# Patient Record
Sex: MALE | Race: WHITE | NOT HISPANIC OR LATINO
[De-identification: names, ages, dates, MRNs, and addresses within clinical notes are randomized per-mention and may not be internally consistent; named-entity substitution may affect disease eponyms.]

---

## 2021-05-26 ENCOUNTER — TRANSCRIPTION ENCOUNTER (OUTPATIENT)
Age: 31
End: 2021-05-26

## 2021-10-26 ENCOUNTER — EMERGENCY (EMERGENCY)
Facility: HOSPITAL | Age: 31
LOS: 1 days | Discharge: HOME | End: 2021-10-26
Attending: EMERGENCY MEDICINE
Payer: COMMERCIAL

## 2021-10-26 VITALS
HEIGHT: 73 IN | HEART RATE: 68 BPM | SYSTOLIC BLOOD PRESSURE: 124 MMHG | WEIGHT: 169.98 LBS | TEMPERATURE: 98 F | OXYGEN SATURATION: 98 % | RESPIRATION RATE: 18 BRPM | DIASTOLIC BLOOD PRESSURE: 85 MMHG

## 2021-10-26 PROCEDURE — 99282 EMERGENCY DEPT VISIT SF MDM: CPT

## 2021-10-26 NOTE — ED PROVIDER NOTE - PHYSICAL EXAMINATION
Gen: Well appearing in NAD  Head: NC/AT  Neck: trachea midline  Resp:  No distress  Ext: no deformities  Neuro:  A&O appears non focal  Skin:  Warm and dry as visualized  Psych:  Normal affect and mood    EYES EUFEMIA EOMI left sclera injected no fb

## 2021-10-26 NOTE — ED PROVIDER NOTE - PATIENT PORTAL LINK FT
You can access the FollowMyHealth Patient Portal offered by [facility] by registering at the following website: [email or web address]. By joining [employer]’s FollowMyHealth portal, you will also be able to view your health information using other applications (apps) compatible with our system.

## 2021-10-26 NOTE — ED PROVIDER NOTE - OBJECTIVE STATEMENT
32 yo male presenting to the ER with blood exposure to the eye while pigtail was being removed. no corrective lenses or contacts in use. immediately flushed eye. no visual changes UTD vaccines.

## 2021-10-26 NOTE — ED PROVIDER NOTE - CLINICAL SUMMARY MEDICAL DECISION MAKING FREE TEXT BOX
32 yo male splashed with blood to the face and left eye no visual changes irrigated at eye wash station 15 mins. no acute distress vitals stable. labs and referred to employee health

## 2021-10-26 NOTE — ED ADULT TRIAGE NOTE - WEIGHT IN KG
Patient : Frank De Leon Age: 28 year old Sex: male   MRN: [id number] Encounter Date: 2020      History     Chief Complaint   Patient presents with   • Head Injury With LOC     HPI    28-year-old male presenting to the emergency department today after syncopal episode, head injury.  Patient reports that he was at a friend's house, felt very dizzy, lightheaded, passed out, hit his head on the ground.  Unconscious for 2-3 seconds.  No seizure activity noted.    Patient does state that he has been trying “intermittent fasting” to lose weight.  Only ate a taco salad today and had a cup of coffee.  Did have a strenuous workout this evening.  Drink a pre workout drink prior to the workout.  Has been drinking water.  No dizziness currently.  No headache.  No nausea or vomiting.  No chest pain or shortness of breath.  No other recent illness or injury.    No Known Allergies    Discharge Medication List as of 2020 10:55 PM      Prior to Admission Medications    Details   Lidocaine 5 % Cream Apply 1 application topically every 4 hours as needed (pain). MIX WITH PREPARATION H & HYDROCORTISONEEprescribe, Disp-30 g, R-0      pramoxine-phenylephrine-glycerin-petrolatum (PREPARATION H) 1-0.25-14.4-15 % rectal cream MIX WITH LIDOCAINE & HYDROCORTISONE AND APPLY EVERY 4 HOURS AS NEEDEDDisp-51 g, R-0, Eprescribe             Past Medical History:   Diagnosis Date   • No known problems        Past Surgical History:   Procedure Laterality Date   • NO PAST SURGERIES         Family History   Problem Relation Age of Onset   • Diabetes Maternal Grandmother    • Heart disease Maternal Grandmother    • Diabetes Paternal Grandmother    • Diabetes Maternal Grandfather    • Heart disease Maternal Grandfather    • Diabetes Paternal Grandfather        Social History     Tobacco Use   • Smoking status: Current Every Day Smoker     Packs/day: 0.25     Last attempt to quit: 2013     Years since quittin.0   • Smokeless tobacco: Former  User     Types: Chew   Substance Use Topics   • Alcohol use: Not Currently   • Drug use: Not Currently       Review of Systems  Constitutional, Eyes, ENT, Pulmonary, Cardiovascular, Gastrointestinal, Renal, Endocrine, Genitourinary, Musculoskeletal, Neurologic, Skin, and Psychiatric systems were reviewed and negative unless indicated in the HPI above.    Physical Exam     ED Triage Vitals [06/24/20 2158]   ED Triage Vitals Group      Temp 97.9 °F (36.6 °C)      Heart Rate 91      Resp 18      /61      SpO2 97 %      EtCO2 mmHg       Height 6' 1\" (1.854 m)      Weight 286 lb 9.6 oz (130 kg)      Weight Scale Used ED Actual      BMI (Calculated) 37.81      IBW/kg (Calculated) 79.9       Physical Exam   Constitutional: He is oriented to person, place, and time. He appears well-developed and well-nourished. He is cooperative. No distress.   HENT:   Head: Normocephalic and atraumatic.   Eyes: Pupils are equal, round, and reactive to light. Conjunctivae and EOM are normal. Right eye exhibits no discharge. Left eye exhibits no discharge.   Neck: Normal range of motion. No tracheal deviation present.   Cardiovascular: Normal rate, regular rhythm and normal heart sounds. Exam reveals no gallop and no friction rub.   No murmur heard.  Pulses:       Radial pulses are 2+ on the right side.   Pulmonary/Chest: Effort normal and breath sounds normal. No stridor. No respiratory distress. He has no wheezes. He has no rales. He exhibits no tenderness.   Abdominal: Soft. He exhibits no distension. There is no abdominal tenderness. There is no rigidity, no rebound and no guarding. No hernia.   Musculoskeletal: Normal range of motion.         General: No edema.      Cervical back: He exhibits normal range of motion, no tenderness and no bony tenderness.   Neurological: He is alert and oriented to person, place, and time. He has normal strength. He is not disoriented. No cranial nerve deficit or sensory deficit. Coordination  77.1 normal. GCS eye subscore is 4. GCS verbal subscore is 5. GCS motor subscore is 6.   Skin: Skin is warm and dry. No rash noted. He is not diaphoretic.   Psychiatric: He has a normal mood and affect. His speech is normal and behavior is normal.   Nursing note and vitals reviewed.      ED Course     Procedures    Lab Results     Results for orders placed or performed during the hospital encounter of 06/24/20   Basic Metabolic Panel   Result Value Ref Range    Fasting Status      Sodium 139 135 - 145 mmol/L    Potassium 3.9 3.4 - 5.1 mmol/L    Chloride 103 98 - 107 mmol/L    Carbon Dioxide 28 21 - 32 mmol/L    Anion Gap 12 10 - 20 mmol/L    Glucose 116 (H) 65 - 99 mg/dL    BUN 22 (H) 6 - 20 mg/dL    Creatinine 0.97 0.67 - 1.17 mg/dL    Glomerular Filtration Rate >90 >90    BUN/ Creatinine Ratio 23 7 - 25    Calcium 9.1 8.4 - 10.2 mg/dL   Magnesium   Result Value Ref Range    Magnesium 1.8 1.7 - 2.4 mg/dL   CBC with Automated Differential (performable only)   Result Value Ref Range    WBC 11.3 (H) 4.2 - 11.0 K/mcL    RBC 4.80 4.50 - 5.90 mil/mcL    HGB 14.2 13.0 - 17.0 g/dL    HCT 42.3 39.0 - 51.0 %    MCV 88.1 78.0 - 100.0 fl    MCH 29.6 26.0 - 34.0 pg    MCHC 33.6 32.0 - 36.5 g/dL    RDW-CV 13.0 11.0 - 15.0 %     140 - 450 K/mcL    NRBC 0 <=0 /100 WBC    Neutrophil, Percent 63 %    Lymphocytes, Percent 27 %    Mono, Percent 7 %    Eosinophils, Percent 1 %    Basophils, Percent 1 %    Immature Granulocytes 1 %    Absolute Neutrophils 7.2 1.8 - 7.7 K/mcL    Absolute Lymphocytes 3.0 1.0 - 4.8 K/mcL    Absolute Monocytes 0.8 0.3 - 0.9 K/mcL    Absolute Eosinophils  0.2 0.1 - 0.5 K/mcL    Absolute Basophils 0.1 0.0 - 0.3 K/mcL    Absolute Immmature Granulocytes 0.1 0.0 - 0.2 K/mcL    RDW-SD 42.1 39.0 - 50.0 fL       EKG Results     EKG Interpretation  Rate: 82  Rhythm: normal sinus rhythm   NO STEMI  Abnormality: no    EKG tracing interpreted by ED physician    Radiology Results     Imaging Results    None         ED  Medication Orders (From admission, onward)    Ordered Start     Status Ordering Provider    06/24/20 2216 06/24/20 2217  sodium chloride (NORMAL SALINE) 0.9 % bolus 1,000 mL  ONCE      Last MAR action:  Completed MOOK BOONE               [facility]    VS are WNL. PE showed well appearing male in NAD, normal cardiovascular exam, normal neurologic exam.  I do feel like the patient's presentation today is likely represents neurocardiogenic syncope as he had prodrome, promptly recovered after syncope. I am most concerned about cardiogenic causes of syncope such as arrhythmia, ischemia, structural or valvular abnormalities.  I have also considered but have low suspicion for neurologic syncope due to subarachnoid hemorrhage versus stroke.  Based on the Ralls syncope rule this patient is low risk for a serious outcome as he does not have a history of congestive heart failure, a hematocrit less than 30, EKG abnormalities, shortness of breath symptoms, or a systolic blood pressure less than 90 at triage.      his EKG showed a normal sinus rhythm with normal intervals and no concerning findings making cardiogenic syncope due to dysrhythmia less likely.  his exam revealed no murmurs making valvular abnormalities such as aortic stenosis unlikely.  his neurologic exam here in the emergency department is normal making neurologic causes syncope unlikely.  his labs did not show any acute abnormalities.      Discussed obtaining CT of the head which patient declines.    Syncope likely caused by poor p.o. intake today, vigorous workout, element of dehydration.    I do not feel like any further imaging or workup is indicated on an emergent basis today.  Patient is back to his baseline.  Patient does feel safe for discharge home.   he will be discharged home with directions to follow up with PCP by calling tomorrow to make an appointment, return to the emergency department with recurrence of syncope, chest pain, shortness of breath  nausea, vomiting, diarrhea, dizziness, lightheadedness, vision changes, or any other worrisome concerns.    I have personally and independently reviewed all labs  I have personally and independently reviewed all EKG  I have personally and independently reviewed previous notes including nursing documentation    Juan Manuel Beltran MD    Clinical Impression     ED Diagnosis   1. Syncope and collapse         Disposition        Discharge 6/24/2020 10:54 PM  Frank De Leon discharge to home/self care.           Juan Manuel Beltran MD  06/25/20 0036

## 2021-10-26 NOTE — ED PROVIDER NOTE - ATTENDING CONTRIBUTION TO CARE
32 yo male presenting to the ER with blood exposure to the eye while pigtail was being removed.   pt immediated irrigated eyes x 15 minutes;  pe awake alert in nad heent ncat neck supple cor s1s2 lungs clear abd soft nontender neuro nonfocal dx blood exposure;   protocol for exposure;   source patient for evaluation

## 2021-11-09 ENCOUNTER — EMERGENCY (EMERGENCY)
Facility: HOSPITAL | Age: 31
LOS: 1 days | Discharge: HOME | End: 2021-11-09
Attending: EMERGENCY MEDICINE
Payer: COMMERCIAL

## 2021-11-09 VITALS
OXYGEN SATURATION: 99 % | HEIGHT: 73 IN | HEART RATE: 69 BPM | WEIGHT: 199.96 LBS | RESPIRATION RATE: 20 BRPM | TEMPERATURE: 99 F | DIASTOLIC BLOOD PRESSURE: 72 MMHG | SYSTOLIC BLOOD PRESSURE: 122 MMHG

## 2021-11-09 PROCEDURE — 99282 EMERGENCY DEPT VISIT SF MDM: CPT

## 2021-11-09 NOTE — ED PROVIDER NOTE - PHYSICAL EXAMINATION
Gen: Well appearing in NAD  Head: NC/AT  EYES EUFEMIA EOMI conjunctiva and sclera clear    Neck: trachea midline  Resp:  No distress  Ext: no deformities  Neuro:  A&O appears non focal  Skin:  Warm and dry as visualized  Psych:  Normal affect and mood

## 2021-11-09 NOTE — ED ADULT TRIAGE NOTE - CHIEF COMPLAINT QUOTE
blood droplet exposure to face and possibly eyes while at work. eye wash station utilized. pt. w/o visual disturbance or pain

## 2021-11-09 NOTE — ED PROVIDER NOTE - PATIENT PORTAL LINK FT
You can access the FollowMyHealth Patient Portal offered by [facility] by registering at the following website: [email or web address]. By joining [employer]’s FollowMyHealth portal, you will also be able to view your health information using other applications (apps) compatible with our system.

## 2021-11-09 NOTE — ED PROVIDER NOTE - OBJECTIVE STATEMENT
31 + blood exposure to face and eye, no visual changes no protective eye wear on. flushed eyes for 15 mins. no contect lenses. utd vaccination

## 2021-11-09 NOTE — ED PROVIDER NOTE - ATTENDING CONTRIBUTION TO CARE
Emmie: I performed a face to face bedside interview with patient regarding history of present illness, review of symptoms and past medical history. I completed an independent physical exam.  I have discussed patient's plan of care with advanced care provider.   I agree with note as stated above including HISTORY OF PRESENT ILLNESS, HIV, PAST MEDICAL/SURGICAL/FAMILY/SOCIAL HISTORY, ALLERGIES AND HOME MEDICATIONS, REVIEW OF SYSTEMS, PHYSICAL EXAM, MEDICAL DECISION MAKING and any PROGRESS NOTES during the time I functioned as the attending physician for this patient  unless otherwise noted. My brief assessment is as follows: fluid exposure in eyes, no eye protection, no other complaints. employee. fluid exposure labs. instructions/return precautions.

## 2021-11-13 ENCOUNTER — TRANSCRIPTION ENCOUNTER (OUTPATIENT)
Age: 31
End: 2021-11-13

## 2021-12-20 ENCOUNTER — EMERGENCY (EMERGENCY)
Facility: HOSPITAL | Age: 31
LOS: 1 days | Discharge: HOME | End: 2021-12-20
Attending: EMERGENCY MEDICINE
Payer: COMMERCIAL

## 2021-12-20 VITALS
WEIGHT: 175.05 LBS | HEIGHT: 73 IN | HEART RATE: 73 BPM | DIASTOLIC BLOOD PRESSURE: 93 MMHG | SYSTOLIC BLOOD PRESSURE: 151 MMHG | OXYGEN SATURATION: 99 % | TEMPERATURE: 98 F | RESPIRATION RATE: 18 BRPM

## 2021-12-20 LAB
RAPID RVP RESULT: SIGNIFICANT CHANGE UP
SARS-COV-2 RNA SPEC QL NAA+PROBE: SIGNIFICANT CHANGE UP

## 2021-12-20 PROCEDURE — 99053 MED SERV 10PM-8AM 24 HR FAC: CPT

## 2021-12-20 PROCEDURE — 99284 EMERGENCY DEPT VISIT MOD MDM: CPT

## 2021-12-20 PROCEDURE — 99283 EMERGENCY DEPT VISIT LOW MDM: CPT

## 2021-12-20 PROCEDURE — 0225U NFCT DS DNA&RNA 21 SARSCOV2: CPT

## 2021-12-20 NOTE — ED STATDOCS - PATIENT PORTAL LINK FT
You can access the FollowMyHealth Patient Portal offered by [facility] by registering at the following website: [email or web address]. By joining [employer]’s FollowMyHealth portal, you will also be able to view your health information using other applications (apps) compatible with our system.

## 2021-12-20 NOTE — ED STATDOCS - CARE PLAN
Principal Discharge DX:	Acute URI  Secondary Diagnosis:	Encounter for laboratory testing for COVID-19 virus   1

## 2021-12-20 NOTE — ED STATDOCS - OBJECTIVE STATEMENT
32 y/o male with no PMHx. Patent p/e subjective fever, chills and intermitted cough since last night . No loss of taste or smell. Patient is vaccinated. 30 y/o male with no PMHx. Patent p/e cough, congestion tactile fever and chills since last night . No loss of taste or smell. Patient is COVID vaccinated.

## 2022-01-05 ENCOUNTER — EMERGENCY (EMERGENCY)
Facility: HOSPITAL | Age: 32
LOS: 1 days | Discharge: HOME | End: 2022-01-05
Attending: EMERGENCY MEDICINE
Payer: COMMERCIAL

## 2022-01-05 VITALS
TEMPERATURE: 99 F | SYSTOLIC BLOOD PRESSURE: 148 MMHG | HEIGHT: 73 IN | RESPIRATION RATE: 18 BRPM | HEART RATE: 77 BPM | DIASTOLIC BLOOD PRESSURE: 96 MMHG | WEIGHT: 190.04 LBS | OXYGEN SATURATION: 96 %

## 2022-01-05 LAB
B PERT DNA SPEC QL NAA+PROBE: SIGNIFICANT CHANGE UP
C PNEUM DNA SPEC QL NAA+PROBE: SIGNIFICANT CHANGE UP
FLUAV H1 2009 PAND RNA SPEC QL NAA+PROBE: SIGNIFICANT CHANGE UP
FLUAV H1 RNA SPEC QL NAA+PROBE: SIGNIFICANT CHANGE UP
FLUAV H3 RNA SPEC QL NAA+PROBE: SIGNIFICANT CHANGE UP
FLUAV SUBTYP SPEC NAA+PROBE: SIGNIFICANT CHANGE UP
FLUBV RNA SPEC QL NAA+PROBE: SIGNIFICANT CHANGE UP
HADV DNA SPEC QL NAA+PROBE: SIGNIFICANT CHANGE UP
HCOV PNL SPEC NAA+PROBE: SIGNIFICANT CHANGE UP
HMPV RNA SPEC QL NAA+PROBE: SIGNIFICANT CHANGE UP
HPIV1 RNA SPEC QL NAA+PROBE: SIGNIFICANT CHANGE UP
HPIV2 RNA SPEC QL NAA+PROBE: SIGNIFICANT CHANGE UP
HPIV3 RNA SPEC QL NAA+PROBE: SIGNIFICANT CHANGE UP
HPIV4 RNA SPEC QL NAA+PROBE: SIGNIFICANT CHANGE UP
RAPID RVP RESULT: DETECTED
RV+EV RNA SPEC QL NAA+PROBE: SIGNIFICANT CHANGE UP
SARS-COV-2 RNA SPEC QL NAA+PROBE: DETECTED

## 2022-01-05 PROCEDURE — 99284 EMERGENCY DEPT VISIT MOD MDM: CPT

## 2022-01-05 PROCEDURE — 99283 EMERGENCY DEPT VISIT LOW MDM: CPT

## 2022-01-05 PROCEDURE — 0225U NFCT DS DNA&RNA 21 SARSCOV2: CPT

## 2022-01-05 NOTE — ED STATDOCS - NS ED ROS FT
Const: + fevers, + chills, + myalgias  HEENT: + nasal congestion,  sore throat  Cardiac: no palpitations, no chest pain  Resp: no wheezing, no SOB  ABD: no ABD pain, no vomiting, no diarrhea  : no dysuria, no discharge  Ext: no deformity  Skin: no rashes, no lacerations

## 2022-01-05 NOTE — ED STATDOCS - PHYSICAL EXAMINATION
Const: Appears well, in no acute distress  HEENT: Clear rhinorrhea  Cardiac: RRR,   Resp: CTA B/L, no wheezes  ABD: Soft, NT/ND  Ext: Full, symmetrical ROM  Skin: No rashes or lacerations appreciated.

## 2022-01-05 NOTE — ED ADULT TRIAGE NOTE - HEIGHT IN FEET
6 Bilobed Flap Text: The defect edges were debeveled with a #15c scalpel blade.  Given the location of the defect and the proximity to free margins a bilobe flap was deemed most appropriate.  Using a sterile surgical marker, an appropriate bilobe flap drawn around the defect.    The area thus outlined was incised deep to adipose tissue with a #15 scalpel blade.  The skin margins were undermined to an appropriate distance in all directions utilizing iris scissors.

## 2022-01-05 NOTE — ED STATDOCS - PATIENT PORTAL LINK FT
You can access the FollowMyHealth Patient Portal offered by [facility] by registering at the following website: [email or web address]. By joining [employer]’s FollowMyHealth portal, you will also be able to view your health information using other applications (apps) compatible with our system.

## 2022-01-05 NOTE — ED STATDOCS - CLINICAL SUMMARY MEDICAL DECISION MAKING FREE TEXT BOX
30 y/o M presents for flu-like illness x 2 days, reaching its vivi today. Vitals WNL, no respiratory distress. RVP sent, advised quarantine until results, supportive treatment.

## 2022-01-05 NOTE — ED STATDOCS - OBJECTIVE STATEMENT
30 y/o M presents for flu-like symptoms x 2 days that started mild and have gotten progressively worse associated with subjective fevers and myalgias that started today. Denies SOB. Fully vaccinated.

## 2022-01-05 NOTE — ED STATDOCS - PRO INTERPRETER NEED 2
Pt was requested to have Rapid Covid test to be done prior to surgery/procedure. Understands that surgery/procedure maybe subjected to cancel if not completed prior to DOS and to bring copy of rapid testing in DOS. If positive, pt must contact surgeon immediately or with any other questions. English

## 2022-01-06 PROBLEM — Z78.9 OTHER SPECIFIED HEALTH STATUS: Chronic | Status: ACTIVE | Noted: 2021-12-20

## 2022-02-22 ENCOUNTER — EMERGENCY (EMERGENCY)
Age: 32
LOS: 1 days | Discharge: HOME | End: 2022-02-22
Admitting: EMERGENCY MEDICINE
Payer: OTHER MISCELLANEOUS

## 2022-02-22 VITALS
SYSTOLIC BLOOD PRESSURE: 115 MMHG | OXYGEN SATURATION: 99 % | TEMPERATURE: 98 F | HEART RATE: 84 BPM | RESPIRATION RATE: 17 BRPM | DIASTOLIC BLOOD PRESSURE: 78 MMHG

## 2022-02-22 LAB — HIV 1+2 AB+HIV1 P24 AG SERPL QL IA: SIGNIFICANT CHANGE UP

## 2022-02-22 PROCEDURE — 99283 EMERGENCY DEPT VISIT LOW MDM: CPT

## 2022-02-22 NOTE — ED PROVIDER NOTE - PATIENT PORTAL LINK FT
You can access the FollowMyHealth Patient Portal offered by [facility] by registering at the following website: [email or web address]. By joining [employer]’s FollowMyHealth portal, you will also be able to view your health information using other applications (apps) compatible with our system.

## 2022-02-22 NOTE — ED PEDIATRIC TRIAGE NOTE - CHIEF COMPLAINT QUOTE
Blood exposure to b/l eyes while suturing a laceration. Eyes rinsed with saline. Denies PMH, PSH, Allergy to sulfa drugs

## 2022-02-22 NOTE — ED PROVIDER NOTE - CLINICAL SUMMARY MEDICAL DECISION MAKING FREE TEXT BOX
32 yo male seen post blood exposure to eye without any pain or burning at the eye. HIV and Hepatitis serology sent.

## 2022-02-22 NOTE — ED PROVIDER NOTE - OBJECTIVE STATEMENT
31 year male with no pmhx seen post explosure of blood to the eye during a head laceration repair. No symptoms.

## 2022-02-23 LAB
HAV IGM SER-ACNC: SIGNIFICANT CHANGE UP
HBV CORE IGM SER-ACNC: SIGNIFICANT CHANGE UP
HBV SURFACE AG SER-ACNC: SIGNIFICANT CHANGE UP
HCV AB S/CO SERPL IA: 0.1 S/CO — SIGNIFICANT CHANGE UP (ref 0–0.99)
HCV AB SERPL-IMP: SIGNIFICANT CHANGE UP

## 2022-05-17 ENCOUNTER — NON-APPOINTMENT (OUTPATIENT)
Age: 32
End: 2022-05-17

## 2022-05-18 ENCOUNTER — NON-APPOINTMENT (OUTPATIENT)
Age: 32
End: 2022-05-18

## 2022-05-24 ENCOUNTER — RESULT REVIEW (OUTPATIENT)
Age: 32
End: 2022-05-24

## 2022-05-24 ENCOUNTER — OUTPATIENT (OUTPATIENT)
Dept: OUTPATIENT SERVICES | Facility: HOSPITAL | Age: 32
LOS: 1 days | End: 2022-05-24

## 2022-05-24 ENCOUNTER — APPOINTMENT (OUTPATIENT)
Dept: ULTRASOUND IMAGING | Facility: CLINIC | Age: 32
End: 2022-05-24
Payer: COMMERCIAL

## 2022-05-24 ENCOUNTER — TRANSCRIPTION ENCOUNTER (OUTPATIENT)
Age: 32
End: 2022-05-24

## 2022-05-24 DIAGNOSIS — Z00.8 ENCOUNTER FOR OTHER GENERAL EXAMINATION: ICD-10-CM

## 2022-05-24 PROCEDURE — 72070 X-RAY EXAM THORAC SPINE 2VWS: CPT | Mod: 26

## 2022-05-24 PROCEDURE — 72100 X-RAY EXAM L-S SPINE 2/3 VWS: CPT | Mod: 26

## 2022-05-24 PROCEDURE — 76870 US EXAM SCROTUM: CPT | Mod: 26

## 2022-08-23 ENCOUNTER — NON-APPOINTMENT (OUTPATIENT)
Age: 32
End: 2022-08-23

## 2022-08-23 ENCOUNTER — APPOINTMENT (OUTPATIENT)
Dept: INTERNAL MEDICINE | Facility: CLINIC | Age: 32
End: 2022-08-23

## 2022-08-23 VITALS
WEIGHT: 182 LBS | OXYGEN SATURATION: 99 % | HEART RATE: 82 BPM | HEIGHT: 73 IN | BODY MASS INDEX: 24.12 KG/M2 | TEMPERATURE: 98 F | RESPIRATION RATE: 15 BRPM

## 2022-08-23 VITALS — SYSTOLIC BLOOD PRESSURE: 120 MMHG | DIASTOLIC BLOOD PRESSURE: 77 MMHG

## 2022-08-23 DIAGNOSIS — Z81.8 FAMILY HISTORY OF OTHER MENTAL AND BEHAVIORAL DISORDERS: ICD-10-CM

## 2022-08-23 DIAGNOSIS — Z80.42 FAMILY HISTORY OF MALIGNANT NEOPLASM OF PROSTATE: ICD-10-CM

## 2022-08-23 DIAGNOSIS — Z00.00 ENCOUNTER FOR GENERAL ADULT MEDICAL EXAMINATION W/OUT ABNORMAL FINDINGS: ICD-10-CM

## 2022-08-23 DIAGNOSIS — Z23 ENCOUNTER FOR IMMUNIZATION: ICD-10-CM

## 2022-08-23 DIAGNOSIS — Z82.49 FAMILY HISTORY OF ISCHEMIC HEART DISEASE AND OTHER DISEASES OF THE CIRCULATORY SYSTEM: ICD-10-CM

## 2022-08-23 DIAGNOSIS — Z87.438 PERSONAL HISTORY OF OTHER DISEASES OF MALE GENITAL ORGANS: ICD-10-CM

## 2022-08-23 PROCEDURE — 90686 IIV4 VACC NO PRSV 0.5 ML IM: CPT

## 2022-08-23 PROCEDURE — 93000 ELECTROCARDIOGRAM COMPLETE: CPT | Mod: 59

## 2022-08-23 PROCEDURE — G0008: CPT | Mod: 59

## 2022-08-23 PROCEDURE — 90715 TDAP VACCINE 7 YRS/> IM: CPT

## 2022-08-23 PROCEDURE — G0444 DEPRESSION SCREEN ANNUAL: CPT | Mod: 59

## 2022-08-23 PROCEDURE — 90472 IMMUNIZATION ADMIN EACH ADD: CPT

## 2022-08-23 PROCEDURE — 36415 COLL VENOUS BLD VENIPUNCTURE: CPT

## 2022-08-23 PROCEDURE — 99385 PREV VISIT NEW AGE 18-39: CPT | Mod: 25

## 2022-08-23 NOTE — HEALTH RISK ASSESSMENT
[Excellent] : ~his/her~  mood as  excellent [Never] : Never [Yes] : Yes [Monthly or less (1 pt)] : Monthly or less (1 point) [1 or 2 (0 pts)] : 1 or 2 (0 points) [Never (0 pts)] : Never (0 points) [No] : In the past 12 months have you used drugs other than those required for medical reasons? No [No falls in past year] : Patient reported no falls in the past year [0] : 2) Feeling down, depressed, or hopeless: Not at all (0) [PHQ-2 Negative - No further assessment needed] : PHQ-2 Negative - No further assessment needed [HIV Test offered] : HIV Test offered [Hepatitis C test offered] : Hepatitis C test offered [None] : None [Alone] : lives alone [Employed] : employed [Significant Other] : lives with significant other [# Of Children ___] : has [unfilled] children [Sexually Active] : sexually active [Feels Safe at Home] : Feels safe at home [Fully functional (bathing, dressing, toileting, transferring, walking, feeding)] : Fully functional (bathing, dressing, toileting, transferring, walking, feeding) [Fully functional (using the telephone, shopping, preparing meals, housekeeping, doing laundry, using] : Fully functional and needs no help or supervision to perform IADLs (using the telephone, shopping, preparing meals, housekeeping, doing laundry, using transportation, managing medications and managing finances) [With Patient/Caregiver] : , with patient/caregiver [Reviewed updated] : Reviewed, updated [Designated Healthcare Proxy] : Designated healthcare proxy [Name: ___] : Health Care Proxy's Name: [unfilled]  [Relationship: ___] : Relationship: [unfilled] [Aggressive treatment] : aggressive treatment [I will adhere to the patient's wishes.] : I will adhere to the patient's wishes. [Audit-CScore] : 1 [de-identified] : Stay active, walks, resistance training  [de-identified] : Balanced- vegetarian- cooks at home  [[id number]] : 0 [Change in mental status noted] : No change in mental status noted [Language] : denies difficulty with language [Behavior] : denies difficulty with behavior [Learning/Retaining New Information] : denies difficulty learning/retaining new information [Handling Complex Tasks] : denies difficulty handling complex tasks [Reasoning] : denies difficulty with reasoning [Spatial Ability and Orientation] : denies difficulty with spatial ability and orientation [Reports changes in hearing] : Reports no changes in hearing [Reports changes in vision] : Reports no changes in vision [FreeTextEntry3] : GF  [de-identified] : She has IUD  [AdvancecareDate] : 8/23/2022 [FreeTextEntry4] : [id number]

## 2022-08-23 NOTE — HISTORY OF PRESENT ILLNESS
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 Originally from [place]. Used to practice [employer]. Had many testicular injuries and head concussion- headaches (2016) saw neuro. Had MRI, negative. Symptoms have subsided.  \par In 2016 also noted a small mass in his L testicle. Had an US which showed a benign cyst. Also had a repeat this year which was normal and showed mild variocele. \par Patient currently feels well and denies any acute complaints.  \par \par FHx \par Grandfather, early cardiac death at age 56 \par \par Social history \par Occasional alcohol use \par Never a smoker \par No illicit drug use \par Lives at home alone. Has GF. No children \par \par HCM \par Tdap and Flu vaccine administered today \par Negative depression screening \par EKG- Benign J waves inferior leads and poor R wave progression. May be normal for age. No chest pain, palpitations or any other cardiac complaints \par

## 2022-08-23 NOTE — ASSESSMENT
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 Used to practice [employer]. Had many testicular injuries and head concussion- headaches (2016) saw neuro. Had MRI, negative. Symptoms have subsided.  \par In 2016 also noted a small mass in his L testicle. Had an US which showed a benign cyst. Also had a repeat this year which was normal and showed mild variocele. \par Patient currently feels well and denies any acute complaints.  \par \par FHx \par Grandfather, early cardiac death at age 56 \par \par Social history \par Occasional alcohol use \par Never a smoker \par No illicit drug use \par Lives at home alone. Has GF. No children \par \par HCM \par Tdap and Flu vaccine administered today \par Negative depression screening \par EKG- Benign J waves inferior leads and poor R wave progression. May be normal for age. No chest pain, palpitations or any other cardiac complaints \par \par \par rto 6-12 months

## 2022-08-23 NOTE — PHYSICAL EXAM
[No Abdominal Bruit] : a ~M bruit was not heard ~T in the abdomen [No Edema] : there was no peripheral edema [No Palpable Aorta] : no palpable aorta [Coordination Grossly Intact] : coordination grossly intact [No Focal Deficits] : no focal deficits [Normal Gait] : normal gait [Normal] : affect was normal and insight and judgment were intact

## 2022-08-26 ENCOUNTER — NON-APPOINTMENT (OUTPATIENT)
Age: 32
End: 2022-08-26

## 2022-08-26 LAB
25(OH)D3 SERPL-MCNC: 25.5 NG/ML
ALBUMIN SERPL ELPH-MCNC: 4.5 G/DL
ALP BLD-CCNC: 40 U/L
ALT SERPL-CCNC: 9 U/L
ANION GAP SERPL CALC-SCNC: 12 MMOL/L
APPEARANCE: ABNORMAL
AST SERPL-CCNC: 15 U/L
BACTERIA: NEGATIVE
BASOPHILS # BLD AUTO: 0.04 K/UL
BASOPHILS NFR BLD AUTO: 0.8 %
BILIRUB SERPL-MCNC: 0.4 MG/DL
BILIRUBIN URINE: NEGATIVE
BLOOD URINE: NEGATIVE
BUN SERPL-MCNC: 14 MG/DL
CALCIUM SERPL-MCNC: 9.5 MG/DL
CHLORIDE SERPL-SCNC: 105 MMOL/L
CHOLEST SERPL-MCNC: 129 MG/DL
CO2 SERPL-SCNC: 25 MMOL/L
COLOR: ABNORMAL
CREAT SERPL-MCNC: 1.4 MG/DL
EGFR: 69 ML/MIN/1.73M2
EOSINOPHIL # BLD AUTO: 0.07 K/UL
EOSINOPHIL NFR BLD AUTO: 1.5 %
ESTIMATED AVERAGE GLUCOSE: 105 MG/DL
GLUCOSE QUALITATIVE U: NEGATIVE
GLUCOSE SERPL-MCNC: 90 MG/DL
HBA1C MFR BLD HPLC: 5.3 %
HCT VFR BLD CALC: 44.7 %
HCV AB SER QL: NONREACTIVE
HCV S/CO RATIO: 0.07 S/CO
HDLC SERPL-MCNC: 47 MG/DL
HGB BLD-MCNC: 15 G/DL
HIV1+2 AB SPEC QL IA.RAPID: NONREACTIVE
HYALINE CASTS: 1 /LPF
IMM GRANULOCYTES NFR BLD AUTO: 0 %
KETONES URINE: NEGATIVE
LDLC SERPL CALC-MCNC: 68 MG/DL
LEUKOCYTE ESTERASE URINE: NEGATIVE
LYMPHOCYTES # BLD AUTO: 1.98 K/UL
LYMPHOCYTES NFR BLD AUTO: 41.6 %
MAN DIFF?: NORMAL
MCHC RBC-ENTMCNC: 31.1 PG
MCHC RBC-ENTMCNC: 33.6 GM/DL
MCV RBC AUTO: 92.5 FL
MICROSCOPIC-UA: NORMAL
MONOCYTES # BLD AUTO: 0.41 K/UL
MONOCYTES NFR BLD AUTO: 8.6 %
NEUTROPHILS # BLD AUTO: 2.26 K/UL
NEUTROPHILS NFR BLD AUTO: 47.5 %
NITRITE URINE: NEGATIVE
NONHDLC SERPL-MCNC: 81 MG/DL
PH URINE: 6
PLATELET # BLD AUTO: 184 K/UL
POTASSIUM SERPL-SCNC: 4.5 MMOL/L
PROT SERPL-MCNC: 6.5 G/DL
PROTEIN URINE: ABNORMAL
RBC # BLD: 4.83 M/UL
RBC # FLD: 12.1 %
RED BLOOD CELLS URINE: 2 /HPF
SODIUM SERPL-SCNC: 142 MMOL/L
SPECIFIC GRAVITY URINE: 1.03
SQUAMOUS EPITHELIAL CELLS: 0 /HPF
TRIGL SERPL-MCNC: 66 MG/DL
TSH SERPL-ACNC: 1.02 UIU/ML
UROBILINOGEN URINE: NORMAL
WBC # FLD AUTO: 4.76 K/UL
WHITE BLOOD CELLS URINE: 1 /HPF

## 2023-03-31 ENCOUNTER — NON-APPOINTMENT (OUTPATIENT)
Age: 33
End: 2023-03-31

## 2023-06-12 ENCOUNTER — EMERGENCY (EMERGENCY)
Facility: HOSPITAL | Age: 33
LOS: 1 days | Discharge: HOME | End: 2023-06-12
Attending: STUDENT IN AN ORGANIZED HEALTH CARE EDUCATION/TRAINING PROGRAM
Payer: COMMERCIAL

## 2023-06-12 VITALS
DIASTOLIC BLOOD PRESSURE: 80 MMHG | SYSTOLIC BLOOD PRESSURE: 118 MMHG | TEMPERATURE: 99 F | HEART RATE: 60 BPM | RESPIRATION RATE: 16 BRPM

## 2023-06-12 PROCEDURE — 99283 EMERGENCY DEPT VISIT LOW MDM: CPT

## 2023-06-12 PROCEDURE — 99284 EMERGENCY DEPT VISIT MOD MDM: CPT

## 2023-06-12 RX ORDER — RALTEGRAVIR 400 MG/1
400 TABLET, FILM COATED ORAL ONCE
Refills: 0 | Status: COMPLETED | OUTPATIENT
Start: 2023-06-12 | End: 2023-06-12

## 2023-06-12 RX ORDER — EMTRICITABINE AND TENOFOVIR DISOPROXIL FUMARATE 200; 300 MG/1; MG/1
1 TABLET, FILM COATED ORAL ONCE
Refills: 0 | Status: COMPLETED | OUTPATIENT
Start: 2023-06-12 | End: 2023-06-12

## 2023-06-12 RX ADMIN — RALTEGRAVIR 400 MILLIGRAM(S): 400 TABLET, FILM COATED ORAL at 14:46

## 2023-06-12 RX ADMIN — EMTRICITABINE AND TENOFOVIR DISOPROXIL FUMARATE 1 TABLET(S): 200; 300 TABLET, FILM COATED ORAL at 14:46

## 2023-06-12 NOTE — ED PROVIDER NOTE - ATTENDING APP SHARED VISIT CONTRIBUTION OF CARE
I, Cinthay Oliver MD, have reviewed the ACP's documentation. After personally examining the patient, getting an independent history, and formulating an MDM, my findings have been added/edited to this documentation as indicated.

## 2023-06-12 NOTE — ED PROVIDER NOTE - OBJECTIVE STATEMENT
32-year-old male presents emergency department status post needlestick.  Patient reports that he was suturing a central line up in the MICU when he was stuck with a needle.  Source patient's bloods are being obtained.  Patient is up-to-date with hepatitis B vaccinations and his tetanus vaccination.  Denies any other complaints

## 2023-06-12 NOTE — ED PROVIDER NOTE - CLINICAL SUMMARY MEDICAL DECISION MAKING FREE TEXT BOX
32-year-old male presents emergency department status post needlestick while suturing in the MICU.  Risk benefits postexposure prophylaxis discussed and will take his first dose in the emergency department and follow-up source patient's blood work this afternoon.  Patient will follow-up with employee health within 3 days

## 2023-06-12 NOTE — ED PROVIDER NOTE - PATIENT PORTAL LINK FT
You can access the FollowMyHealth Patient Portal offered by [facility] by registering at the following website: [email or web address]. By joining [employer]’s FollowMyHealth portal, you will also be able to view your health information using other applications (apps) compatible with our system.

## 2023-12-27 NOTE — ED STATDOCS - CPE ED ENMT NORM
Medication: levothyroxine  Last office visit date: 11/14/23   Next appointment scheduled?: Yes   Number of refills given: 90 with no refills    
normal...

## 2024-01-15 NOTE — ED STATDOCS - COVID-19  TEST TYPE
[facility] General Surgery [facility]     Post-Operative Care Instructions     Dr. Kelby Perkins MD, [facility]     [phone]          1. General: You will feel pulling sensations around the wound or funny aches and pains around the incisions. This is normal. Even minor surgery is a change in your body and this is your body’s way of reacting to it. If you have had abdominal surgery, it may help to support the incision with a small pillow or blanket for comfort when moving or coughing.     2. Wound care:  Okay to shower.  The glue will fall off over the next week or 2.   Use ice for at least the 1st 48 hours.  Do not use for longer than 20 minutes at a time. Use 3 times per day.     3. Water: You may shower over the wounds. Do not bathe or use a pool or hot tub until cleared by the physician.   If you were discharged with a drain, make sure drain site is covered with plastic wrap before showering.      4. Activity: You may go up and down stairs, walk as much as you are comfortable, but walk at least 3 times each day. If you have had abdominal surgery, do not lift anything heavier than 20 pounds for at least 4 weeks.      5. Diet: You may resume a regular diet. If you had a same-day surgery or overnight stay surgery, you may wish to eat lightly for a few days: soups, crackers, and sandwiches. You may resume a regular diet when ready.      6. Medications: Resume all of your previous medications, unless told otherwise by the doctor. Avoid aspirin products for 2-3 days after the date of surgery. You may, at that time, began to take them again. Use Tylenol and Ibuprofen for pain control.  You may alternate these medications every 3 hours.  For example: you may take Tylenol at noon, Ibuprofen at 3:00 p.m., and Tylenol again at 6:00 p.m., etc. You should use ice to assist with pain control as above.  You do not need to take narcotic pain medication unless you are having significant pain.   If you were prescribed a  narcotic pain medication containing Tylenol, such as Percocet or Norco, do not use supplemental Tylenol.      7. Driving: You will need someone to drive you home on the day of surgery or discharge. Do not drive or make any important decisions while on narcotic pain medication or 24 hours and after anesthesia or sedation for surgery. Generally, you may drive when your off all narcotic pain medications and you are comfortable.      8. Upset Stomach: You may take Maalox, Tums, or similar items for an upset stomach. If your narcotic pain medication causes an upset stomach, do not take it on an empty stomach. Try taking it with at least some crackers or toast.      9. Constipation: Patients often experience constipation after surgery. You may take over-the-counter medication for this, such as Metamucil, Senokot, Dulcolax, milk of magnesia, etc. You may take a suppository unless you have had anorectal surgery such as a procedure on your hemorrhoids. If you experience significant nausea or vomiting after abdominal surgery, call the office before trying any of these medications.     10. Call the office: If you are experiencing any of the following: fevers above 101.5°, significant nausea or vomiting, if the wound develops drainage and/or there is excessive redness around the wound, or if you have significant diarrhea or other worsening symptoms.     11. Pain: You may be given a prescription for pain medication.  This will be sent to your pharmacy prior to discharge.     12. Sexual Activity: You may resume sexual activity when you feel ready and comfortable and your incision is sealed and healed without apparent infection risk.     13. Urination: If you have not urinated in 6 hours, go directly to the ER for evaluation for urinary retention.      14. Follow-up in 2 weeks.         MOLECULAR PCR